# Patient Record
Sex: MALE | Race: ASIAN | Employment: UNEMPLOYED | URBAN - METROPOLITAN AREA
[De-identification: names, ages, dates, MRNs, and addresses within clinical notes are randomized per-mention and may not be internally consistent; named-entity substitution may affect disease eponyms.]

---
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
## 2024-11-29 ENCOUNTER — OFFICE VISIT (OUTPATIENT)
Dept: PRIMARY CARE CLINIC | Age: 19
End: 2024-11-29
Payer: COMMERCIAL

## 2024-11-29 VITALS
WEIGHT: 133 LBS | HEART RATE: 75 BPM | HEIGHT: 71 IN | SYSTOLIC BLOOD PRESSURE: 110 MMHG | DIASTOLIC BLOOD PRESSURE: 62 MMHG | OXYGEN SATURATION: 99 % | BODY MASS INDEX: 18.62 KG/M2

## 2024-11-29 DIAGNOSIS — Z00.00 ENCOUNTER FOR HEALTH MAINTENANCE EXAMINATION IN ADULT: Primary | ICD-10-CM

## 2024-11-29 DIAGNOSIS — Z91.018 WALNUT ALLERGY: ICD-10-CM

## 2024-11-29 DIAGNOSIS — Z91.010 PEANUT ALLERGY: ICD-10-CM

## 2024-11-29 PROCEDURE — 99395 PREV VISIT EST AGE 18-39: CPT | Performed by: STUDENT IN AN ORGANIZED HEALTH CARE EDUCATION/TRAINING PROGRAM

## 2024-11-29 RX ORDER — EPINEPHRINE 0.3 MG/.3ML
0.3 INJECTION SUBCUTANEOUS PRN
Qty: 1 EACH | Refills: 3 | Status: SHIPPED | OUTPATIENT
Start: 2024-11-29

## 2024-11-29 SDOH — ECONOMIC STABILITY: INCOME INSECURITY: HOW HARD IS IT FOR YOU TO PAY FOR THE VERY BASICS LIKE FOOD, HOUSING, MEDICAL CARE, AND HEATING?: NOT HARD AT ALL

## 2024-11-29 SDOH — ECONOMIC STABILITY: FOOD INSECURITY: WITHIN THE PAST 12 MONTHS, YOU WORRIED THAT YOUR FOOD WOULD RUN OUT BEFORE YOU GOT MONEY TO BUY MORE.: NEVER TRUE

## 2024-11-29 SDOH — ECONOMIC STABILITY: FOOD INSECURITY: WITHIN THE PAST 12 MONTHS, THE FOOD YOU BOUGHT JUST DIDN'T LAST AND YOU DIDN'T HAVE MONEY TO GET MORE.: NEVER TRUE

## 2024-11-29 ASSESSMENT — PATIENT HEALTH QUESTIONNAIRE - PHQ9
SUM OF ALL RESPONSES TO PHQ QUESTIONS 1-9: 0
2. FEELING DOWN, DEPRESSED OR HOPELESS: NOT AT ALL
SUM OF ALL RESPONSES TO PHQ QUESTIONS 1-9: 0
1. LITTLE INTEREST OR PLEASURE IN DOING THINGS: NOT AT ALL
SUM OF ALL RESPONSES TO PHQ9 QUESTIONS 1 & 2: 0
SUM OF ALL RESPONSES TO PHQ QUESTIONS 1-9: 0
SUM OF ALL RESPONSES TO PHQ QUESTIONS 1-9: 0

## 2024-11-29 NOTE — ASSESSMENT & PLAN NOTE
Discussed obtaining metabolic screening for hyperlipidemia, diabetes as well as HIV, Hepatitis C screening.  Patient is not currently sexually active; never has been.  He is without high risk factors for HIV, Hepatitis C.  Patient concerned regarding cost, insurance coverage; will discuss with parents and will call office if desires baseline labs.

## 2024-11-29 NOTE — ASSESSMENT & PLAN NOTE
Chronic, at goal (stable), continue current treatment plan    Orders:    EPINEPHrine (EPIPEN) 0.3 MG/0.3ML SOAJ injection; Inject 0.3 mLs into the muscle as needed (emergency allergic reaction) Use as directed for allergic reaction

## 2024-11-29 NOTE — PROGRESS NOTES
[facility]  [facility]  [address] [place] [address]  Dept: [phone]       Date of Visit:  2024  Patient Name: Sorin Romero   Patient :  2005     CHIEF COMPLAINT:     Sorin Romero is a 19 y.o. male who presents today to be evaluated for the following condition(s):  Chief Complaint   Patient presents with    New Patient     Est care with provider - no concerns    Last PCP - Dr. Freitas  LOV - 2021  Last labs - CBC 2021  Specialists - none  HM - HIV screen, Hep C screen, tdap, flu - wants to wait       HISTORY OF PRESENT ILLNESS:      HPI   Patient is presenting today to establish care.    Patient reports that he is feeling well; denies any acute medical concerns today.  Chart reviewed; health history updated.    Patient with peanut, walnut allergy; needs refill of Epipen.    Last blood work was 2021.   Colorectal cancer screening: Not due  Due for HM as follows: Vaccinations, HIV, HCV screening    Does patient follow any particular diet? No  Does patient exercise on a regular basis? No  Does patient use tobacco products? No  How much alcohol do they consume in a week? None  Do they have concerns with their overall health today? No   Does the patient have any labs they need done for their insurance/job? No  Does the patient have any paperwork that needs filled out for their insurance/job? No    Preventative Testing:  Pneumococcal vaccine: Up to date  COVID-19 vaccine: Recommended booster  Influenza vaccine: Recommended  Hepatitis vaccine: Up to date  Shingles: Not indicated for this patient  STI screening/counseling: Done today in office  Screening for HIV: Recommended  Colorectal cancer screening test: Not indicated for this patient  Prostate Cancer Screening: Not indicated for this patient  Cardiovascular screening blood test: Recommended  Diabetes screening blood test: Recommended  Smoking cessation counseling: Not indicated for this